# Patient Record
Sex: FEMALE | Race: WHITE
[De-identification: names, ages, dates, MRNs, and addresses within clinical notes are randomized per-mention and may not be internally consistent; named-entity substitution may affect disease eponyms.]

---
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
## 2020-08-07 ENCOUNTER — HOSPITAL ENCOUNTER (EMERGENCY)
Dept: HOSPITAL 39 - ER | Age: 20
Discharge: HOME | End: 2020-08-07
Payer: COMMERCIAL

## 2020-08-07 VITALS — TEMPERATURE: 98.2 F

## 2020-08-07 VITALS — DIASTOLIC BLOOD PRESSURE: 75 MMHG | SYSTOLIC BLOOD PRESSURE: 119 MMHG | OXYGEN SATURATION: 97 %

## 2020-08-07 DIAGNOSIS — O21.9: Primary | ICD-10-CM

## 2020-08-07 DIAGNOSIS — Z3A.10: ICD-10-CM

## 2020-08-07 DIAGNOSIS — O23.41: ICD-10-CM

## 2020-08-07 PROCEDURE — 87088 URINE BACTERIA CULTURE: CPT

## 2020-08-07 PROCEDURE — 83690 ASSAY OF LIPASE: CPT

## 2020-08-07 PROCEDURE — 87186 SC STD MICRODIL/AGAR DIL: CPT

## 2020-08-07 PROCEDURE — 87086 URINE CULTURE/COLONY COUNT: CPT

## 2020-08-07 PROCEDURE — 80053 COMPREHEN METABOLIC PANEL: CPT

## 2020-08-07 PROCEDURE — 81001 URINALYSIS AUTO W/SCOPE: CPT

## 2020-08-07 PROCEDURE — 85025 COMPLETE CBC W/AUTO DIFF WBC: CPT

## 2020-08-07 PROCEDURE — 84702 CHORIONIC GONADOTROPIN TEST: CPT

## 2020-08-07 NOTE — ED.PDOC
History of Present Illness





- General


Chief Complaint: OB/GYN Problem


Stated Complaint: N/V,dizzness,10 weeks IUP


Time Seen by Provider: 20 10:47


Source: patient


Exam Limitations: no limitations





- History of Present Illness


Initial Comments: 





This is a 19-year-old female with no significant past medical history, G1, 

at approximately 10 weeks by dates.  LMP May 26, 2020.  She is reporting nausea,

vomiting as well as dizziness that began last night.  She states has been unable

to tolerate p.o. since last night.  She denies any hematemesis or diarrhea.  She

has not taken anything for nausea.  She denies any weight loss.  She has had no 

follow-up with OB for this pregnancy, she had a positive pregnancy test at a 

local pregnancy clinic, but no ultrasound. She denies any vaginal bleeding, 

cramping or discharge at this time.  She states she had some mild cramping more 

than a week ago that is completely resolved.


Allergies/Adverse Reactions: 


Allergies





NO KNOWN ALLERGY Allergy (Verified 17 17:50)


   





Home Medications: 


Ambulatory Orders





Migraine Medicine PO DAILY PRN 17 


Cephalexin Monohydrate [Keflex] 500 mg PO BID #10 cap 20 


Prenatal Mv & Min W/Fe Fumarat [Prenatal Multi + Dha 27-0.8-200 mg] 1 cap PO 

DAILY #60 cap 20 


Promethazine Tab [Phenergan Tablet] 12.5 - 25 mg PO Q6H PRN #12 tab 20 











Review of Systems





- Review of Systems


Constitutional: Denies: chills, fever


Respiratory: Denies: cough, orthopnea, short of breath


Cardiology: Denies: edema, syncope


Gastrointestinal/Abdominal: States: nausea, vomiting.  Denies: abdominal pain, 

diarrhea


Genitourinary: Denies: discharge, dysuria, hematuria, pain


Musculoskeletal: Denies: joint pain, muscle pain


Skin: Denies: dryness, lumps, rash


Neurological: Denies: headache, paresthesia, tingling


Endocrine: Denies: no symptoms reported


Hematologic/Lymphatic: States: no symptoms reported





Past Medical History (General)





- Patient Medical History


Hx Stroke: No


Hx Asthma: No


Hx Congestive Heart Failure: No


Hx Diabetes: No


Surgical History: no surgical history





- Vaccination History


Hx Influenza Vaccination: No





- Social History


Hx Tobacco Use: No


Hx Alcohol Use: No





- Female History


Patient is a Female of Child Bearing Age (10 -59 yrs old): Yes


Hx Last Menstrual Period: 20


Patient Pregnant: Yes


Expected Date of Delivery:: 20


Hx Gestational Age: 10





Family Medical History





- Family History


  ** Mother


Family History: Unknown


Living Status: 


Hx Family;Other: Pt is adopted.





Physical Exam





- Physical Exam


General Appearance: Alert, Comfortable, No apparent distress


Eye Exam: bilateral normal


Ears, Nose, Throat: normal ENT inspection, normal pharynx


Neck: full range of motion, supple


Respiratory: chest non-tender, lungs clear, normal breath sounds, no respiratory

distress, no accessory muscle use


Cardiovascular/Chest: normal peripheral pulses, regular rate, rhythm, no edema, 

no gallop


Gastrointestinal/Abdominal: non tender, soft, no organomegaly


Extremity: normal range of motion, non-tender, normal inspection





Progress





- Progress


Progress: 





20 12:13


Rechecked.  Patient feeling much better, nausea resolved, tolerating p.o.  

Discussed need for follow-up with OB for formal OB ultrasound and dates.  Strict

warnings given to return the emergency room for abdominal pain, vaginal 

bleeding, discharge, fever, or any other concerns.








DDX: Pregnancy, IUP, low suspicion for ectopic, UTI





MDM:


Patient presenting with vomiting in early pregnancy, suspected morning sickness.

 Does not meet criteria for HG. feeling much better after a liter of fluids and 

IV Phenergan, tolerating p.o.  She has bacteriuria on the UA, urine cultures 

pending, will discharge with antibiotics in addition antiemetics.  Recommended 

follow-up with OB for ongoing management of pregnancy.








Reginald Major DO


[facility] #[id number]





- Results/Orders


Results/Orders: 





                                        


Limited bedside ultrasound performed by me at 11:55 PM.  Ultrasound quality was 

limited due to early stage of pregnancy and transabdominal approach, no 

transvaginal probe was available on the ED ultrasound machine.  There is what 

appears to be a gestational sac in the uterus, no visible fetal cardiac motion, 

although this is likely due to very early stage of pregnancy.  It appears much 

smaller than a 10-week fetus.





20 11:03


URINE CULTURE W/COLONY COUNT Stat 





20 11:51


Urine Culture Stat 








                         Laboratory Results - last 24 hr











  20





  11:03 11:15 11:15


 


WBC   11.8 H 


 


RBC   5.19 


 


Hgb   13.2 


 


Hct   38.8 


 


MCV   74.8 L 


 


MCH   25.4 L 


 


MCHC   33.9 


 


RDW   18.1 H 


 


Plt Count   320 


 


MPV   7.6 


 


Absolute Neuts (auto)   9.30 H 


 


Absolute Lymphs (auto)   1.70 


 


Absolute Monos (auto)   0.70 


 


Absolute Eos (auto)   0.00 


 


Absolute Basos (auto)   0.10 


 


Neutrophils %   78.9 H 


 


Lymphocytes %   14.3 L 


 


Monocytes %   6.0 


 


Eosinophils %   0.3 L 


 


Basophils %   0.5 


 


Sodium    134 L


 


Potassium    3.4 L


 


Chloride    99 L


 


Carbon Dioxide    25


 


Anion Gap    13.4


 


BUN    14


 


Creatinine    0.74


 


BUN/Creatinine Ratio    18.9


 


Random Glucose    90


 


Serum Osmolality    268.2 L


 


Calcium    9.3


 


Total Bilirubin    1.0


 


AST    20


 


ALT    14


 


Alkaline Phosphatase    58 L


 


Serum Total Protein    8.4 H


 


Albumin    4.6


 


Globulin    3.8 H


 


Albumin/Globulin Ratio    1.2


 


Lipase    28


 


Beta HCG, Quant    50574.0 H


 


Urine Color  Yellow  


 


Urine Appearance  Sl cloudy  


 


Urine pH  6.0  


 


Ur Specific Gravity  >= 1.030  


 


Urine Protein  Trace  


 


Urine Glucose (UA)  Negative  


 


Urine Ketones  40 H  


 


Urine Blood  Trace-intact H  


 


Urine Nitrite  Negative  


 


Urine Bilirubin  Small H  


 


Urine Urobilinogen  1.0  


 


Ur Leukocyte Esterase  Negative  


 


Urine RBC  5-10 H  


 


Urine WBC  10-20 H  


 


Ur Epithelial Cells  Tntc  


 


Amorphous Sediment  1+  


 


Urine Bacteria  3+ H  


 


Urine Mucus  Trace  














Departure





- Departure


Clinical Impression: 


 Nausea and vomiting during pregnancy prior to 22 weeks gestation, Acute 

cystitis during pregnancy in first trimester





Disposition: Discharge to Home or Self Care


Condition: Good


Departure Forms:  ED Discharge - Pt. Copy, Patient Portal Self Enrollment


Instructions:  Morning Sickness (DC)


Referrals: 


Aydin Katz MD [Active Staff] - 1-2 Weeks


Prescriptions: 


Cephalexin Monohydrate [Keflex] 500 mg PO BID #10 cap


Promethazine Tab [Phenergan Tablet] 12.5 - 25 mg PO Q6H PRN #12 tab


 PRN Reason: Nausea


Prenatal Mv & Min W/Fe Fumarat [Prenatal Multi + Dha 27-0.8-200 mg] 1 cap PO 

DAILY #60 cap


Home Medications: 


Ambulatory Orders





Migraine Medicine PO DAILY PRN 17 


Cephalexin Monohydrate [Keflex] 500 mg PO BID #10 cap 20 


Prenatal Mv & Min W/Fe Fumarat [Prenatal Multi + Dha 27-0.8-200 mg] 1 cap PO 

DAILY #60 cap 20 


Promethazine Tab [Phenergan Tablet] 12.5 - 25 mg PO Q6H PRN #12 tab 20 








Additional Instructions: 


Eat a bland diet, eat small meals more frequently.  Return to emergency room 

immediately for intractable vomiting, inability to tolerate oral liquids, 

abdominal pain, bleeding, or any other concerns